# Patient Record
Sex: MALE | Race: WHITE | NOT HISPANIC OR LATINO | URBAN - METROPOLITAN AREA
[De-identification: names, ages, dates, MRNs, and addresses within clinical notes are randomized per-mention and may not be internally consistent; named-entity substitution may affect disease eponyms.]

---

## 2023-01-17 ENCOUNTER — TELEPHONE (OUTPATIENT)
Dept: NURSING | Facility: CLINIC | Age: 28
End: 2023-01-17

## 2023-01-17 NOTE — TELEPHONE ENCOUNTER
Mother calling for patient. Mother reports son is completely nonverbal and she is his legal guardian.   Do not see this information in the chart.    Mother just has a medication question. Does Paxlovid interact with Prozac. Son's endocrinologist prescribed Paxlovid but did not mention the Prozac which is prescribed by the psychiatrist.    Looked up medication on [employer] that reports a major interaction:  Concurrent use of FLUOXETINE and RITONAVIR may result in increased fluoxetine exposure; increased risk of QT-interval prolongation.    Encouraged mother to discuss with ordering provider or pharmacist.  Mother states she is going to stop the paxlovid as patient has vomited after taking and was directed to stop if that happened.   Encouraged mother to still talk to pharmacist to see if okay to continue Prozac or if it should be held for any period of time.   Mother agrees to speak with pharmacist.   Estelle Dowell RN   01/17/23 9:55 AM  [facility] Nurse Advisor